# Patient Record
Sex: FEMALE | Race: BLACK OR AFRICAN AMERICAN
[De-identification: names, ages, dates, MRNs, and addresses within clinical notes are randomized per-mention and may not be internally consistent; named-entity substitution may affect disease eponyms.]

---

## 2023-05-14 ENCOUNTER — HOSPITAL ENCOUNTER (INPATIENT)
Dept: HOSPITAL 12 - ER | Age: 78
LOS: 11 days | Discharge: HOME | DRG: 880 | End: 2023-05-25
Payer: MEDICARE

## 2023-05-14 VITALS — SYSTOLIC BLOOD PRESSURE: 123 MMHG | DIASTOLIC BLOOD PRESSURE: 69 MMHG

## 2023-05-14 VITALS — WEIGHT: 183 LBS | BODY MASS INDEX: 35.93 KG/M2 | HEIGHT: 60 IN

## 2023-05-14 DIAGNOSIS — Z79.899: ICD-10-CM

## 2023-05-14 DIAGNOSIS — E11.9: ICD-10-CM

## 2023-05-14 DIAGNOSIS — Z88.6: ICD-10-CM

## 2023-05-14 DIAGNOSIS — Z91.148: ICD-10-CM

## 2023-05-14 DIAGNOSIS — R45.851: ICD-10-CM

## 2023-05-14 DIAGNOSIS — G25.0: ICD-10-CM

## 2023-05-14 DIAGNOSIS — F41.9: Primary | ICD-10-CM

## 2023-05-14 DIAGNOSIS — F32.9: ICD-10-CM

## 2023-05-14 DIAGNOSIS — Z82.49: ICD-10-CM

## 2023-05-14 DIAGNOSIS — E03.9: ICD-10-CM

## 2023-05-14 DIAGNOSIS — E66.01: ICD-10-CM

## 2023-05-14 DIAGNOSIS — R79.1: ICD-10-CM

## 2023-05-14 DIAGNOSIS — Z79.01: ICD-10-CM

## 2023-05-14 DIAGNOSIS — R51.9: ICD-10-CM

## 2023-05-14 DIAGNOSIS — Z88.8: ICD-10-CM

## 2023-05-14 DIAGNOSIS — I48.0: ICD-10-CM

## 2023-05-14 DIAGNOSIS — Z86.12: ICD-10-CM

## 2023-05-14 DIAGNOSIS — F10.20: ICD-10-CM

## 2023-05-14 DIAGNOSIS — I10: ICD-10-CM

## 2023-05-14 DIAGNOSIS — Z99.3: ICD-10-CM

## 2023-05-14 DIAGNOSIS — I25.10: ICD-10-CM

## 2023-05-14 DIAGNOSIS — E78.5: ICD-10-CM

## 2023-05-14 DIAGNOSIS — R07.9: ICD-10-CM

## 2023-05-14 RX ADMIN — ATORVASTATIN CALCIUM SCH MG: 20 TABLET, FILM COATED ORAL at 21:00

## 2023-05-14 RX ADMIN — Medication SCH MG: at 18:30

## 2023-05-14 NOTE — NUR
REPORT WAS GIVEN TO MHU RN. PT WILL BE TRANSFERED TO U ROOM #138B AFTER 1930. 
REPORT WAS GIVEN TO NIGHT SHIFT ER RN.

## 2023-05-14 NOTE — NUR
GPS ADMISSION NOTE : Patient is a 77 year old female, brought to the ED from [facility]. The patient is on a 5150 for DTS. Per the hold, the patient verbalized that she 
doesn't feel safe and " Cant be honest with anyone " about suicidal thoughts because " I 
don't want a 5150 ". Upon face to face evaluation, this patient was anxious, disorganized 
and suspious. The patient refused to sign any papers, was argumentative about the unit rules 
and adamant that " I never said that I wanted to hurt myself. That's a lie ". Multiple 
medical problems noted in the chart, including Polio, wheelchair bound, fibromyalgia, HTN, 
DM, depression and alcoholism to name a few. The patients belongings were inventoried and a 
Patients Rights Handbook along with The  Patient Advisement were provided. VS stable, safety 
stratiges remain in place. No SI at this time.

## 2023-05-15 VITALS — DIASTOLIC BLOOD PRESSURE: 58 MMHG | SYSTOLIC BLOOD PRESSURE: 126 MMHG

## 2023-05-15 VITALS — SYSTOLIC BLOOD PRESSURE: 108 MMHG | DIASTOLIC BLOOD PRESSURE: 46 MMHG

## 2023-05-15 VITALS — SYSTOLIC BLOOD PRESSURE: 112 MMHG | DIASTOLIC BLOOD PRESSURE: 52 MMHG

## 2023-05-15 LAB — TSH SERPL DL<=0.005 MIU/L-ACNC: 2.38 MIU/ML (ref 0.36–3.74)

## 2023-05-15 RX ADMIN — ACETAMINOPHEN PRN MG: 325 TABLET ORAL at 09:56

## 2023-05-15 RX ADMIN — ACETAMINOPHEN PRN MG: 325 TABLET ORAL at 20:16

## 2023-05-15 RX ADMIN — LORAZEPAM PRN MG: 1 TABLET ORAL at 06:21

## 2023-05-15 RX ADMIN — Medication SCH MG: at 20:12

## 2023-05-15 RX ADMIN — LEVOTHYROXINE SODIUM SCH MCG: 100 TABLET ORAL at 06:21

## 2023-05-15 RX ADMIN — Medication SCH MG: at 09:21

## 2023-05-15 RX ADMIN — RIVAROXABAN SCH MG: 10 TABLET, FILM COATED ORAL at 17:50

## 2023-05-15 RX ADMIN — Medication SCH MG: at 10:22

## 2023-05-15 RX ADMIN — Medication SCH MG: at 09:26

## 2023-05-15 RX ADMIN — ATORVASTATIN CALCIUM SCH MG: 20 TABLET, FILM COATED ORAL at 20:11

## 2023-05-15 NOTE — NUR
Firearms Report:



 completed and submitted a DO[employer] firearms report for 5150 a danger to self 
certifications. A copy of report has been placed in patient chart.

## 2023-05-15 NOTE — NUR
SB Initial Discharge Note:



Pt currently resides alone at home located at [address] [facility] [address] [place] [address] 
[phone] ([phone]). Pt stated she wants to return home on her own upon discharge. SB will 
continue to work with pt, family and MD to ensure a safe and proper discharge plan.

## 2023-05-15 NOTE — NUR
GPS: Nursing Notes: Destructive Behavior To Self:

Patient is awake and responding to her name, gets easily anxious when redirected, poor 
grooming, unkempt appearance, argumentative, stated "The 5150 is a lie.. I don't want to 
kill myself..", upper extremities tremors - stated "The doctor told me to take my medication 
with alcohol, so the tremors would stop.." impaired judgment, needs prompting to participate 
in therapeutic groups, unable to formulate a viable plan for self care, denies SI, continue 
to monitor for safety, continue with treatment plan.

## 2023-05-15 NOTE — NUR
GPS: Nursing Notes: Refusing Xarelto:

Patient was about to take her medication when suddenly she paused and stated "I remember 
now, I cannot take Xarelto because I fell.. I will talk to the doctor..", explained the pros 
and cons of the medication, but continue to refuse her medication, continue to monitor for 
safety, continue with treatment plan.

## 2023-05-15 NOTE — NUR
GPS:   Pt.is pleasant,nice and cooperative so far. Due bedtime meds taken without any 
problems. Denies wanting to hurt self when asked. Needs attended. Will continue to monitor.

## 2023-05-16 VITALS — SYSTOLIC BLOOD PRESSURE: 124 MMHG | DIASTOLIC BLOOD PRESSURE: 58 MMHG

## 2023-05-16 VITALS — DIASTOLIC BLOOD PRESSURE: 48 MMHG | SYSTOLIC BLOOD PRESSURE: 135 MMHG

## 2023-05-16 VITALS — SYSTOLIC BLOOD PRESSURE: 147 MMHG | DIASTOLIC BLOOD PRESSURE: 52 MMHG

## 2023-05-16 LAB
CHOLEST SERPL-MCNC: 179 MG/DL (ref ?–200)
HDLC SERPL-MCNC: 65 MG/DL (ref 40–60)
TRIGL SERPL-MCNC: 59 MG/DL (ref 30–150)

## 2023-05-16 RX ADMIN — Medication SCH MG: at 08:42

## 2023-05-16 RX ADMIN — Medication SCH MG: at 20:48

## 2023-05-16 RX ADMIN — THERA TABS SCH UDTAB: TAB at 08:42

## 2023-05-16 RX ADMIN — ATORVASTATIN CALCIUM SCH MG: 20 TABLET, FILM COATED ORAL at 20:47

## 2023-05-16 RX ADMIN — LEVOTHYROXINE SODIUM SCH MCG: 100 TABLET ORAL at 06:05

## 2023-05-16 RX ADMIN — RIVAROXABAN SCH MG: 10 TABLET, FILM COATED ORAL at 17:17

## 2023-05-16 RX ADMIN — Medication SCH MG: at 08:49

## 2023-05-16 RX ADMIN — Medication SCH MG: at 08:43

## 2023-05-16 RX ADMIN — ACETAMINOPHEN PRN MG: 325 TABLET ORAL at 08:50

## 2023-05-16 NOTE — NUR
SW Discharge Update:



Pt continues to state she wants to return home located at [address] [facility] [address] [place], [place] [id number] on her own upon discharge where she resides alone. Pt is refusing home health 
services. Pt stated she has no family contact and has been on her own for a long time. In 
addition, pt stated she refuses skilled nursing facility and other services as well.

## 2023-05-16 NOTE — NUR
GPS: Nursing Notes: Destructive Behavior To Self:

Patient is awake and responding to her name, depressed mood and anxious affect, 
argumentative at times, selectively refusing her medications, stated "There is nothing wrong 
with my heart... And Xarelto, I do not take this medication..", explained the pros and cons 
of medications, but continue to refuse the medications, needs prompting to participate in 
therapeutic groups, moving around the unit on her w/c, unable to formulate a viable plan for 
self care, continue to monitor for safety, continue with treatment plan.

## 2023-05-17 VITALS — SYSTOLIC BLOOD PRESSURE: 122 MMHG | DIASTOLIC BLOOD PRESSURE: 59 MMHG

## 2023-05-17 VITALS — SYSTOLIC BLOOD PRESSURE: 123 MMHG | DIASTOLIC BLOOD PRESSURE: 56 MMHG

## 2023-05-17 VITALS — DIASTOLIC BLOOD PRESSURE: 51 MMHG | SYSTOLIC BLOOD PRESSURE: 119 MMHG

## 2023-05-17 RX ADMIN — RIVAROXABAN SCH MG: 10 TABLET, FILM COATED ORAL at 18:00

## 2023-05-17 RX ADMIN — Medication SCH MG: at 08:25

## 2023-05-17 RX ADMIN — Medication SCH MG: at 08:24

## 2023-05-17 RX ADMIN — LEVOTHYROXINE SODIUM SCH MCG: 100 TABLET ORAL at 06:28

## 2023-05-17 RX ADMIN — Medication PRN MG: at 00:18

## 2023-05-17 RX ADMIN — ATORVASTATIN CALCIUM SCH MG: 20 TABLET, FILM COATED ORAL at 21:05

## 2023-05-17 RX ADMIN — ACETAMINOPHEN PRN MG: 325 TABLET ORAL at 00:18

## 2023-05-17 RX ADMIN — THERA TABS SCH UDTAB: TAB at 08:24

## 2023-05-17 RX ADMIN — Medication SCH MG: at 21:05

## 2023-05-17 NOTE — NUR
Received pt in room  awake and responding to her name. Pt is depressed, isolative and stays 
in her room for most of the day. Pt has poor insight .Pt is compliant with medications. Pt 
has unkempt appearance and refuses to shower. Pt is  unable to formulate a viable plan for 
self care. Pt denies SI and  made a verbal contract for safety with this writer . Pt stated 
"I came here to get help with my appointments not because I'm depressed or suicidal". " I 
need help scheduling  an  appointment with a dentist". " I need help finding a good care 
giver that wont steal from me". Reassurance and emotional support provided.Safety measures 
still in place for safety. Continue to monitor for safety, continue with treatment plan.

## 2023-05-17 NOTE — NUR
Pt is selective with medications, pt took morning medications and refuse evening 
medications. Pt can be argumentative at times. Pt was argumentative when explaining the 
importance of being compliant with medications. Reassurance provided.

## 2023-05-17 NOTE — NUR
Pt requested medication for help with falling asleep, and for pain in the back of her head. 
Pt stated, "they have not given me any pain medication for this problem that I've had for a 
long time now, and they have never diagnosed me with what this problem is." 4/10 P/L. Gave 
Ambien 5mg for insomnia, and Tylenol 650mg for mild pain relief. Safety measures in place. 
Will continue to monitor.

## 2023-05-17 NOTE — NUR
GPS Nursing notes: 14 day hold Certification: Patient place on  a 5250, certification given 
to patient and explained. Patient was informed that a certification reviewe hearing will be 
held with in four days. Also, patient's right advocate will call to provide assistant on 
answering his questions. The court has been notified of this certification via [facility] portal 
on this day.

## 2023-05-18 VITALS — SYSTOLIC BLOOD PRESSURE: 126 MMHG | DIASTOLIC BLOOD PRESSURE: 47 MMHG

## 2023-05-18 VITALS — SYSTOLIC BLOOD PRESSURE: 163 MMHG | DIASTOLIC BLOOD PRESSURE: 72 MMHG

## 2023-05-18 VITALS — SYSTOLIC BLOOD PRESSURE: 182 MMHG | DIASTOLIC BLOOD PRESSURE: 77 MMHG

## 2023-05-18 VITALS — DIASTOLIC BLOOD PRESSURE: 72 MMHG | SYSTOLIC BLOOD PRESSURE: 144 MMHG

## 2023-05-18 VITALS — DIASTOLIC BLOOD PRESSURE: 63 MMHG | SYSTOLIC BLOOD PRESSURE: 126 MMHG

## 2023-05-18 VITALS — SYSTOLIC BLOOD PRESSURE: 155 MMHG | DIASTOLIC BLOOD PRESSURE: 76 MMHG

## 2023-05-18 LAB
BUN SERPL-MCNC: 24 MG/DL (ref 7–18)
CHLORIDE SERPL-SCNC: 100 MMOL/L (ref 98–107)
CO2 SERPL-SCNC: 28 MMOL/L (ref 21–32)
CREAT SERPL-MCNC: 1 MG/DL (ref 0.6–1.3)
GLUCOSE SERPL-MCNC: 105 MG/DL (ref 74–106)
HCT VFR BLD AUTO: 39.3 % (ref 31.2–41.9)
MCH RBC QN AUTO: 29 UUG (ref 24.7–32.8)
MCV RBC AUTO: 87.2 FL (ref 75.5–95.3)
PLATELET # BLD AUTO: 353 K/UL (ref 179–408)
POTASSIUM SERPL-SCNC: 4 MMOL/L (ref 3.5–5.1)

## 2023-05-18 RX ADMIN — Medication SCH MG: at 09:00

## 2023-05-18 RX ADMIN — HYDROCODONE BITARTRATE AND ACETAMINOPHEN PRN TAB: 5; 325 TABLET ORAL at 15:33

## 2023-05-18 RX ADMIN — ATORVASTATIN CALCIUM SCH MG: 20 TABLET, FILM COATED ORAL at 20:42

## 2023-05-18 RX ADMIN — RIVAROXABAN SCH MG: 10 TABLET, FILM COATED ORAL at 18:00

## 2023-05-18 RX ADMIN — LEVOTHYROXINE SODIUM SCH MCG: 100 TABLET ORAL at 06:11

## 2023-05-18 RX ADMIN — Medication PRN MG: at 00:22

## 2023-05-18 RX ADMIN — THERA TABS SCH UDTAB: TAB at 09:00

## 2023-05-18 RX ADMIN — Medication SCH MG: at 20:42

## 2023-05-18 NOTE — NUR
Patient states that she is having a chest pain rated 10 on the scale of 1 to 10. Internist 
was informed and ordered HS Troponin, Hydrocodone 1 tab PO Q6PRN, Nitroglycerin 0.4 mg SL, 
EKG, D- Dimer, BMP, CBC.
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
## 2023-05-18 NOTE — NUR
Pt is argumentative , labile, intrusive and manipulative. Pt gets easily irritable with 
staff when she does not gets her way. Pt is not compliant with medication and questions each 
of her medications and states " i do not take those medications" My doctor told me not to 
take them" . Pt manipulates her roommate in to not taking her medications and sends her to 
ask staff for snack or water for her , instead of her asking for her self. Reassurance and 
emotional support provided. Safety measures still in place. Continue to monitor for safety, 
continue with treatment plan.

## 2023-05-19 VITALS — DIASTOLIC BLOOD PRESSURE: 55 MMHG | SYSTOLIC BLOOD PRESSURE: 97 MMHG

## 2023-05-19 VITALS — SYSTOLIC BLOOD PRESSURE: 119 MMHG | DIASTOLIC BLOOD PRESSURE: 51 MMHG

## 2023-05-19 VITALS — SYSTOLIC BLOOD PRESSURE: 137 MMHG | DIASTOLIC BLOOD PRESSURE: 49 MMHG

## 2023-05-19 RX ADMIN — ATORVASTATIN CALCIUM SCH MG: 20 TABLET, FILM COATED ORAL at 20:53

## 2023-05-19 RX ADMIN — Medication SCH MG: at 01:20

## 2023-05-19 RX ADMIN — THERA TABS SCH UDTAB: TAB at 08:56

## 2023-05-19 RX ADMIN — Medication SCH MG: at 08:56

## 2023-05-19 RX ADMIN — RIVAROXABAN SCH MG: 10 TABLET, FILM COATED ORAL at 21:00

## 2023-05-19 RX ADMIN — Medication SCH MG: at 21:00

## 2023-05-19 RX ADMIN — LEVOTHYROXINE SODIUM SCH MCG: 100 TABLET ORAL at 06:41

## 2023-05-19 RX ADMIN — Medication SCH MG: at 09:00

## 2023-05-19 NOTE — NUR
Patient refused xarelto and metoprolol meds, patient stated "she only take metoprolol when 
she needs it", and scared to take xarelto meds afraid to bleed, explained to the patient 
that she needs both meds for her heart problem and blood pressure, strongly refused, get 
agitated, cont to monitor.

## 2023-05-19 NOTE — NUR
Nursing- Uses wheel chair to roam around safety reviewed emphasized. Selective with routine 
meds. claimed she does not take any Psych med. she wants to leave, she does not belong here, 
she can take care of herself, req. to take a taxi ,drop by the bank and good to go per pt. 
Encouraged to attend her group activity , refused. Denies any discomfort, no chest pain

## 2023-05-19 NOTE — NUR
Patient has been anxious, manipulative, demanding and intrusive. This patient was heard 
telling the room mate"Don't take the medications from these people. They are trying to hurt 
you. If you change rooms, we are not friends anymore. ". This writer moved the room mate per 
request and to provide a quieter environment for both. Soon after that, the patient had a 
anxiety attack, was asking for this med and that, cold cloth, to be taken to the ED, etc. 
When this writer tried to get her VS in order to give the " Metoprol now, or I am going to 
die" ! The patient proceeded to rip the cuff off her arm , in a tantrum like fashion, throw 
the washcloth on the floor , and climbed into bed. The VS were obtained and they were 
133/90, HR 79. The patient eventually calmed down ,after a lengthy attention seeking 
episode. Safety Stratiges are in place. The patient continues to refuse medications for 
anxiety or sleep and also tries to manipulate the staff and other patient's , whenever 
possible.

## 2023-05-20 VITALS — DIASTOLIC BLOOD PRESSURE: 51 MMHG | SYSTOLIC BLOOD PRESSURE: 146 MMHG

## 2023-05-20 VITALS — DIASTOLIC BLOOD PRESSURE: 50 MMHG | SYSTOLIC BLOOD PRESSURE: 106 MMHG

## 2023-05-20 VITALS — SYSTOLIC BLOOD PRESSURE: 116 MMHG | DIASTOLIC BLOOD PRESSURE: 58 MMHG

## 2023-05-20 RX ADMIN — Medication SCH MG: at 08:45

## 2023-05-20 RX ADMIN — Medication SCH MG: at 20:33

## 2023-05-20 RX ADMIN — THERA TABS SCH UDTAB: TAB at 08:36

## 2023-05-20 RX ADMIN — ATORVASTATIN CALCIUM SCH MG: 20 TABLET, FILM COATED ORAL at 20:30

## 2023-05-20 RX ADMIN — HYDROCODONE BITARTRATE AND ACETAMINOPHEN PRN TAB: 5; 325 TABLET ORAL at 22:37

## 2023-05-20 RX ADMIN — Medication SCH MG: at 08:37

## 2023-05-20 RX ADMIN — RIVAROXABAN SCH MG: 10 TABLET, FILM COATED ORAL at 17:34

## 2023-05-20 RX ADMIN — Medication SCH MG: at 08:36

## 2023-05-20 RX ADMIN — LEVOTHYROXINE SODIUM SCH MCG: 100 TABLET ORAL at 06:41

## 2023-05-20 NOTE — NUR
Patient complaining of headaches and neck pain, requested a Narco for pain, 8/10, given as 
ordered.  Patient noted with slight anxiety and redirect behavior and she able to follow, 
cont to monitor.

## 2023-05-20 NOTE — NUR
Nursing - Requesting to bathe self using readybath , and own cream, informed patient Hosp. 
does not carry a readybath , offered to use wash cloth , claimed she does not use wash cloth 
refused to shower , never had shower for a long time per patient .Patient gets intrusive .

## 2023-05-20 NOTE — NUR
Nursing - Hesitancy taking her routine am medications , selective  witj medications ,  
administered ,reviewed by the Nursing Student (from [facility]) with their 
Nursing Instructor. Alert,  oriented , interactive , making her needs known. Stays in bed 
most of the time during morning, able to transfers self ind. wheel chair to bed . Ind. with 
wheel chair propulsion. Safety continue to emphasized, patient verbalized understanding.

## 2023-05-20 NOTE — NUR
Patient stayed in her room most the shift, calm at this time, no complains of pain, patient 
brp, remains uncooperative with medications, cont to monitor.

## 2023-05-20 NOTE — NUR
Nursing - When tried to get patient's phone back , noted unable  to find the phone per 
patient she left it on top of her blanker.  was called and reported was 
happened to her phone. Checked under her bed and her roommates bed, not found. Tried to call 
her phone toring, was noted phone was in the trash bag of room # 137 . Claimed not sure who 
took the phone and who threw it to the trash. Informed patient reason why we dont let 
patient have their  own phone. Patient phone list  given to her . Patient;s put back in the 
safe, informed patient will not get her phone till discharge , verbalized understanding

## 2023-05-20 NOTE — NUR
Nursing- Patient requested to have her phone from the safe just to get some telephone 
numbers - but unable to get at this time r/t phone needed to be charge ( Charging by the 
Nurses station-printer) will try to charge for now, till have enough power to get 
information needed by patient, staff were informed. (Dark blue cell phone with black cord 
for charging)

## 2023-05-21 VITALS — SYSTOLIC BLOOD PRESSURE: 131 MMHG | DIASTOLIC BLOOD PRESSURE: 78 MMHG

## 2023-05-21 VITALS — DIASTOLIC BLOOD PRESSURE: 53 MMHG | SYSTOLIC BLOOD PRESSURE: 124 MMHG

## 2023-05-21 VITALS — DIASTOLIC BLOOD PRESSURE: 51 MMHG | SYSTOLIC BLOOD PRESSURE: 135 MMHG

## 2023-05-21 RX ADMIN — Medication PRN MG: at 01:12

## 2023-05-21 RX ADMIN — Medication SCH MG: at 21:00

## 2023-05-21 RX ADMIN — ARIPIPRAZOLE SCH MG: 2 TABLET ORAL at 10:15

## 2023-05-21 RX ADMIN — Medication SCH MG: at 09:00

## 2023-05-21 RX ADMIN — Medication SCH MG: at 08:32

## 2023-05-21 RX ADMIN — ARIPIPRAZOLE SCH MG: 2 TABLET ORAL at 16:37

## 2023-05-21 RX ADMIN — THERA TABS SCH UDTAB: TAB at 08:33

## 2023-05-21 RX ADMIN — ATORVASTATIN CALCIUM SCH MG: 20 TABLET, FILM COATED ORAL at 20:56

## 2023-05-21 RX ADMIN — RIVAROXABAN SCH MG: 10 TABLET, FILM COATED ORAL at 16:38

## 2023-05-21 RX ADMIN — LEVOTHYROXINE SODIUM SCH MCG: 100 TABLET ORAL at 06:26

## 2023-05-21 RX ADMIN — Medication SCH MG: at 08:33

## 2023-05-21 NOTE — NUR
patient is alert and oriented x3,selective with her routine medications, insisting still, 
she does not need 

any anti depressant mediations she denies being depressed, refused HTN medication saying i 
only uses as needs.assisted with all ADLS continue monitoring for safety .

## 2023-05-21 NOTE — NUR
Received patient up in the wheelchair, patient talking to other patient in the TV room, 
patient continue to refused Metoprolol and other psychotropic meds,  MD was aware of the 
patient behavior, patient refused showers, prefer to do her own things, patient noted easily 
get excited or anxious, provide quiet room, assisted her rooms out the room when roommates 
becomes restless, cont to monitor.

## 2023-05-21 NOTE — NUR
Patient alert awake, no further complain of pain/headaches, patient calm, still non 
compliant with medications, cont to monitor.

## 2023-05-22 VITALS — SYSTOLIC BLOOD PRESSURE: 142 MMHG | DIASTOLIC BLOOD PRESSURE: 58 MMHG

## 2023-05-22 VITALS — SYSTOLIC BLOOD PRESSURE: 128 MMHG | DIASTOLIC BLOOD PRESSURE: 54 MMHG

## 2023-05-22 VITALS — SYSTOLIC BLOOD PRESSURE: 152 MMHG | DIASTOLIC BLOOD PRESSURE: 76 MMHG

## 2023-05-22 RX ADMIN — ARIPIPRAZOLE SCH MG: 2 TABLET ORAL at 08:08

## 2023-05-22 RX ADMIN — RIVAROXABAN SCH MG: 10 TABLET, FILM COATED ORAL at 16:48

## 2023-05-22 RX ADMIN — ARIPIPRAZOLE SCH MG: 2 TABLET ORAL at 10:55

## 2023-05-22 RX ADMIN — ATORVASTATIN CALCIUM SCH MG: 20 TABLET, FILM COATED ORAL at 20:34

## 2023-05-22 RX ADMIN — Medication SCH MG: at 20:35

## 2023-05-22 RX ADMIN — LEVOTHYROXINE SODIUM SCH MCG: 100 TABLET ORAL at 06:21

## 2023-05-22 RX ADMIN — Medication SCH MG: at 08:09

## 2023-05-22 RX ADMIN — ARIPIPRAZOLE SCH MG: 2 TABLET ORAL at 16:47

## 2023-05-22 RX ADMIN — THERA TABS SCH UDTAB: TAB at 08:48

## 2023-05-22 RX ADMIN — Medication SCH MG: at 08:48

## 2023-05-22 RX ADMIN — ARIPIPRAZOLE SCH MG: 2 TABLET ORAL at 08:48

## 2023-05-22 RX ADMIN — THERA TABS SCH UDTAB: TAB at 08:09

## 2023-05-22 RX ADMIN — Medication SCH MG: at 08:46

## 2023-05-22 NOTE — NUR
patient is alert and oriented x3,selective with her routine medications, insisting still, 
she does not need any anti depressant mediations she denies being depressed.took Abilify 
after spoke with psychiatrist .assisted with all ADLS continue monitoring for safety .

## 2023-05-23 VITALS — SYSTOLIC BLOOD PRESSURE: 113 MMHG | DIASTOLIC BLOOD PRESSURE: 72 MMHG

## 2023-05-23 VITALS — SYSTOLIC BLOOD PRESSURE: 130 MMHG | DIASTOLIC BLOOD PRESSURE: 43 MMHG

## 2023-05-23 VITALS — DIASTOLIC BLOOD PRESSURE: 68 MMHG | SYSTOLIC BLOOD PRESSURE: 143 MMHG

## 2023-05-23 RX ADMIN — LEVOTHYROXINE SODIUM SCH MCG: 100 TABLET ORAL at 06:29

## 2023-05-23 RX ADMIN — Medication SCH MG: at 09:07

## 2023-05-23 RX ADMIN — Medication PRN MG: at 23:56

## 2023-05-23 RX ADMIN — ARIPIPRAZOLE SCH MG: 2 TABLET ORAL at 09:07

## 2023-05-23 RX ADMIN — Medication PRN MG: at 01:16

## 2023-05-23 RX ADMIN — Medication SCH MG: at 09:00

## 2023-05-23 RX ADMIN — LORAZEPAM PRN MG: 1 TABLET ORAL at 15:15

## 2023-05-23 RX ADMIN — ARIPIPRAZOLE SCH MG: 2 TABLET ORAL at 16:15

## 2023-05-23 RX ADMIN — Medication SCH MG: at 20:28

## 2023-05-23 RX ADMIN — RIVAROXABAN SCH MG: 10 TABLET, FILM COATED ORAL at 17:10

## 2023-05-23 RX ADMIN — THERA TABS SCH UDTAB: TAB at 09:00

## 2023-05-23 RX ADMIN — ATORVASTATIN CALCIUM SCH MG: 20 TABLET, FILM COATED ORAL at 20:28

## 2023-05-23 NOTE — NUR
GPS Nursing notes: Patient refused BP medication and Multi-Vit. , Per Patient "I dont need 
it, I will only take Abilify because so I can go Home". Educated patient on the importance 
of taking Blood pressure medication, but continues to refused.

## 2023-05-23 NOTE — NUR
GPS Nursing notes: Patient is anxious, Patient stated , "I am not going home this week, I 
feel anxious, give me what ever medication that will help me relax". Prn given as ordered 
for anxiety, will continue to monitor.

## 2023-05-23 NOTE — NUR
Patient asleep but arousable, no complain of pain, patient took all po meds from this shift, 
Patient does not really sleep, slept 2.5 hours only, non compliant with psych meds, MD 
aware, refused showers. cont to encourage.

## 2023-05-23 NOTE — NUR
GPS Nursing notes: Patient is lying in bed asleep with no S/S discomforts. fall and safety 
precaution observed. Will continue to monitor.

## 2023-05-23 NOTE — NUR
SB Discharge Update:



Pt continues to state she wants to return home located at [address] [facility] [address] on her own upon discharge where she resides alone. SB contacted pt's insurance 
[employer] [facility] [phone] and spoke with Rosaline from [employer] [phone] who 
helped arrange pt's transportation to return home upon discharge. Rosaline stated 
authorization from the insurance is required and therefore, requested it. Rosaline is 
informed that pt uses a wheelchair and walks minimally. Rosaline requested a wheelchair 
accessible vehicle for day of discharge. Rosaline informed this SB to call back prior to 
discharge to check on authorization. SB will continue to follow-up to ensure a safe and 
proper discharge plan for the pt. DNP, Dr. Nielsen is aware.

## 2023-05-23 NOTE — NUR
GPS Nursing Notes: patient is in room, staying to herself, social with selective peers, 
denies pain or discomforts, 100% of meal intake, took Abilify this morning, stated she wants 
to be discharge. Dr Nielsen aware and spoke with patient , fall and safety precautions 
implemented, emotional support provide.

## 2023-05-24 VITALS — SYSTOLIC BLOOD PRESSURE: 129 MMHG | DIASTOLIC BLOOD PRESSURE: 52 MMHG

## 2023-05-24 VITALS — DIASTOLIC BLOOD PRESSURE: 69 MMHG | SYSTOLIC BLOOD PRESSURE: 142 MMHG

## 2023-05-24 VITALS — SYSTOLIC BLOOD PRESSURE: 119 MMHG | DIASTOLIC BLOOD PRESSURE: 62 MMHG

## 2023-05-24 RX ADMIN — RIVAROXABAN SCH MG: 10 TABLET, FILM COATED ORAL at 17:34

## 2023-05-24 RX ADMIN — LEVOTHYROXINE SODIUM SCH MCG: 100 TABLET ORAL at 06:07

## 2023-05-24 RX ADMIN — ARIPIPRAZOLE SCH MG: 5 TABLET ORAL at 17:31

## 2023-05-24 RX ADMIN — Medication SCH MG: at 08:54

## 2023-05-24 RX ADMIN — ARIPIPRAZOLE SCH MG: 2 TABLET ORAL at 08:54

## 2023-05-24 RX ADMIN — Medication SCH MG: at 09:00

## 2023-05-24 RX ADMIN — THERA TABS SCH UDTAB: TAB at 09:00

## 2023-05-24 RX ADMIN — Medication SCH MG: at 21:01

## 2023-05-24 RX ADMIN — Medication PRN MG: at 21:52

## 2023-05-24 RX ADMIN — ATORVASTATIN CALCIUM SCH MG: 20 TABLET, FILM COATED ORAL at 21:01

## 2023-05-24 RX ADMIN — THERA TABS SCH UDTAB: TAB at 08:54

## 2023-05-24 RX ADMIN — Medication SCH MG: at 08:55

## 2023-05-24 NOTE — NUR
Received patient in her bed, A&0x3, patient verbalizing her feelings in regards her stay in 
the unit. Attentive listening in placed. Patient requesting for early discharge, informed 
her about writ petition. She denies SI. Patient med compliant. Patient is calm and 
cooperative. Prn Ambien given. Effective. Patient slept well this shift. Safety precautions 
in placed.

## 2023-05-24 NOTE — NUR
Received patient seated in the wheelchair, calm and cooperative with medication, took all 
2100 medications, Patient socialized with other patient, patient appears enjoy the company 
of other patient. Patient still refused to shower, cont to offer.

## 2023-05-24 NOTE — NUR
Received pt in room sitting in chair, pt is  calm up on approach. pt is still selective with 
medications and chooses which ones to take. Pt has unkept appearance and is still refusing 
to shower Stating " I have a special cream i use at home to shower". pt can be argumentative 
at times, specially when it comes to medications and showering. Pt socializes with selective 
peers but does not attends group activities . Pt uses the wheelchair to wheel her self 
around the unit.  Pt denies SI and verbally contracted for safety. Reassurance and emotional 
support provided. Safety measures in place . Continue to monitor for safety, continue with 
treatment plan.

## 2023-05-25 VITALS — SYSTOLIC BLOOD PRESSURE: 122 MMHG | DIASTOLIC BLOOD PRESSURE: 40 MMHG

## 2023-05-25 VITALS — DIASTOLIC BLOOD PRESSURE: 40 MMHG | SYSTOLIC BLOOD PRESSURE: 122 MMHG

## 2023-05-25 LAB
BUN SERPL-MCNC: 17 MG/DL (ref 7–18)
CHLORIDE SERPL-SCNC: 103 MMOL/L (ref 98–107)
CO2 SERPL-SCNC: 27 MMOL/L (ref 21–32)
CREAT SERPL-MCNC: 0.8 MG/DL (ref 0.6–1.3)
GLUCOSE SERPL-MCNC: 142 MG/DL (ref 74–106)
MAGNESIUM SERPL-MCNC: 1.9 MG/DL (ref 1.8–2.4)
POTASSIUM SERPL-SCNC: 4.4 MMOL/L (ref 3.5–5.1)

## 2023-05-25 RX ADMIN — ARIPIPRAZOLE SCH MG: 5 TABLET ORAL at 09:53

## 2023-05-25 RX ADMIN — Medication SCH MG: at 09:55

## 2023-05-25 RX ADMIN — LEVOTHYROXINE SODIUM SCH MCG: 100 TABLET ORAL at 06:15

## 2023-05-25 RX ADMIN — THERA TABS SCH UDTAB: TAB at 09:00

## 2023-05-25 NOTE — NUR
Nursing- Discharge planning  in progress, patient was well informed of her discharge back to 
 her home in [place], [place]. [employer]Per , [employer] arranged transportation 
at 1430 this pm

## 2023-05-25 NOTE — NUR
Patient slept most of the night, no complain of pain, continent of bowel and bladder, 
patient calm at this time, blanket provided complaining of cold in the room, room temp was 
adjusted accordingly, cont to monitor.

## 2023-05-25 NOTE — NUR
Discharge Note:



Pt will be discharged home located at [address] [place] [address] 
([phone]) via [employer] [phone] non-medical transportation at 2:30PM. Pt is 
aware and agreeable with discharge plan. Pt refused staff to contact pts daughter. Pt is 
alert and oriented x4 and cleared by the psychiatrist, Dr. Sullivan and DNP, Dr. Nielsen to 
continue care on her own at home. Pt denies any suicidal or homicidal ideation. Pt will 
follow-up with her outpatient Psychiatrist, Dr. Muller [phone] and Internist, Dr. Alanis located at [address] [place] [address] ([phone]). Pt presents 
with calm mood and congruent affect. PHARMACY: [address] [place] [address] 
([phone]).  referred pt to [facility] [address] [facility] [address] [place] [address] ([phone]).

## 2023-05-25 NOTE — NUR
Nursing - Reviewed discharge instructions, prescriptions/medications, safety emphasized, 
skin care, diet, f/u with her PMD as well as psychiatrist, patient verbalized

 understanding. Hesitant to sign instructions , per patient," im not going to take any of 
those medications anyways, i have my own Doctors". All belongings /valuables (Cell phone 
with , black purse , toiletries given back to patient. [employer] here to pick 
up patient to be transported back home to [place], [place]( name - Kevan) . 
Patient has own wheel chair , no leg rest. Patient discharged in good spirit , with no new 
complaints noted.

## 2025-06-20 ENCOUNTER — Encounter (OUTPATIENT)
Dept: URBAN - METROPOLITAN AREA CLINIC 102 | Facility: CLINIC | Age: 80
End: 2025-06-20

## 2025-06-20 VITALS — DIASTOLIC BLOOD PRESSURE: 62 MMHG | SYSTOLIC BLOOD PRESSURE: 122 MMHG | HEIGHT: 66 IN | WEIGHT: 162 LBS

## 2025-06-20 DIAGNOSIS — R19.5 POSITIVE COLORECTAL CANCER SCREENING USING COLOGUARD TEST: ICD-10-CM

## 2025-06-20 DIAGNOSIS — K62.5 RECTAL BLEEDING: ICD-10-CM

## 2025-06-20 PROCEDURE — 99204 OFFICE O/P NEW MOD 45 MIN: CPT | Performed by: STUDENT IN AN ORGANIZED HEALTH CARE EDUCATION/TRAINING PROGRAM

## 2025-06-20 RX ORDER — SODIUM SULFATE, POTASSIUM SULFATE, MAGNESIUM SULFATE 17.5; 3.13; 1.6 G/ML; G/ML; G/ML
SOLUTION, CONCENTRATE ORAL
Qty: 1 | Status: ACTIVE
Start: 2025-06-20
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
## 2025-06-20 NOTE — SERVICENOTES
45 minutes spent in dedicated E/M time during the date of service, including preparation of the medical chart, review of previous information, data analysis/discussion with the patient/family/caregiver.